# Patient Record
(demographics unavailable — no encounter records)

---

## 2025-06-12 NOTE — PHYSICAL EXAM
[de-identified] : Left foot Physical Examination:  General: Alert and oriented x3.  In no acute distress.  Pleasant in nature with a normal affect.  No apparent respiratory distress.  Erythema, Warmth, Rubor: Negative Swelling: Negative  ROM Ankle: 1. Dorsiflexion: 10 degrees 2. Plantarflexion: 40 degrees 3. Inversion: 30 degrees 4. Eversion: 20 degrees  ROM of digits: Normal  Pes Planus: Negative Pes Cavus: Negative  Bunion: Negative Tailor's Bunion (Bunionette): Negative Hammer Toe Deformity/Deformities: Negative  Tenderness to Palpation:  1. Heel Pain: Negative 2. Midfoot Pain: Negative 3. First MTP Joint: Negative 4. Lis Franc Joint: Negative  Tenderness Metatarsals: 1st MT: Negative 2nd MT: Positive 3rd MT: Positive 4th MT: Positive 5th MT: Negative Base of the 5th MT: Negative  Ligament Pain: 1. Lis Franc Ligament: Negative 2. Plantar Fascia Ligament: Positive  Strength:  5/5 TA/GS/EHL/FHL/EDL/ADD/ABD  Pulses: 2+ DP/PT Pulses  Capillary Refill Toes: <2 seconds  Neuro: Intact motor and sensory throughout  Additional Test: 1. Pascual's Squeeze Test: Negative 2. Calcaneal Squeeze Test: Negative [de-identified] : 3 views x-rays left foot reviewed and obtained on 6/12/2025: No fracture seen.

## 2025-06-12 NOTE — HISTORY OF PRESENT ILLNESS
[FreeTextEntry1] : The patient is a 38-year-old female who presents with left foot pain.  She stubbed her small toe left foot and broke it in November 2024.  She saw a podiatrist, nonsurgical care.  Patient went back to podiatrist in January due to pain left small.  Patient was told if not healed, surgery might be necessary.  She states that the pain has gotten worse since then.  Here for a second opinion.  No history of surgery for the left foot.

## 2025-06-12 NOTE — DISCUSSION/SUMMARY
[de-identified] : MRI left foot without contrast ordered to evaluate for stress fracture/soft tissue injury, plantar fascia tear.  Once the MRI is completed, I will call the patient with the results.  Proper shoewear discussed.  Activity modifications.  Over-the-counter NSAIDs/Tylenol as needed, as directed, as tolerated by the patient.  All of her questions were answered.  She understood and agreed to the treatment course we will follow-up with the MRI of the left foot without contrast.

## 2025-07-16 NOTE — PHYSICAL EXAM
[de-identified] : Left foot Physical Examination:  General: Alert and oriented x3.  In no acute distress.  Pleasant in nature with a normal affect.  No apparent respiratory distress.  Erythema, Warmth, Rubor: Negative Swelling: Negative  ROM Ankle: 1. Dorsiflexion: 10 degrees 2. Plantarflexion: 40 degrees 3. Inversion: 30 degrees 4. Eversion: 20 degrees  ROM of digits: Normal  Pes Planus: Negative Pes Cavus: Negative  Bunion: Negative Tailor's Bunion (Bunionette): Negative Hammer Toe Deformity/Deformities: Negative  Tenderness to Palpation:  1. Heel Pain: Negative 2. Midfoot Pain: Negative 3. First MTP Joint: Negative 4. Lis Franc Joint: Negative  Tenderness Metatarsals: 1st MT: Negative 2nd MT: Positive 3rd MT: Positive 4th MT: Positive 5th MT: Negative Base of the 5th MT: Negative  Ligament Pain: 1. Lis Franc Ligament: Negative 2. Plantar Fascia Ligament: Positive  Strength:  5/5 TA/GS/EHL/FHL/EDL/ADD/ABD  Pulses: 2+ DP/PT Pulses  Capillary Refill Toes: <2 seconds  Neuro: Intact motor and sensory throughout  Additional Test: 1. Pascual's Squeeze Test: Negative 2. Calcaneal Squeeze Test: Negative [de-identified] :  Radiology imaging reviewed in office with the patient on 07/16/2025 and I agree with the radiologist's impression below:  EXAM: [id number] - MR FOOT LT  - ORDERED BY: NESTOR MCINTOSH   PROCEDURE DATE:  06/17/2025    INTERPRETATION:  EXAMINATION: MRI left foot without contrast  CLINICAL INFORMATION: Left foot pain  TECHNIQUE: Multiplanar, multisequential MR imaging was performed. Imaging was performed from the level of the navicular through the distal foot.  FINDINGS: There is a nondisplaced fracture of the middle phalanx of the fifth toe with adjacent marrow edema. There are no other fractures. There are no joint effusions. 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 Articular cartilage at the other imaged joints is preserved. The Lisfranc ligament is intact. Tarsometatarsal alignment is normal. The capsular ligaments at the metatarsophalangeal joints and interphalangeal joints are intact. There are no acute plantar plate ruptures. There are no tendon or muscle tears. There is no muscle atrophy. There is small amount of fluid within the first intermetatarsal space bursa, consistent with bursitis.     IMPRESSION: Nondisplaced fracture of the middle phalanx of the fifth toe with adjacent marrow edema.  --- End of Report ---       PATRICIA YOUNGER MD; Attending Radiologist This document has been electronically signed. Jun 23 2025  3:53PM

## 2025-07-16 NOTE — DISCUSSION/SUMMARY
[de-identified] :  Today I had a lengthy discussion with the patient regarding their left foot pain. I have addressed all the patient's concerns surrounding the pathology of their condition.   I have reviewed the patient's MRI results with them in great detail. I recommend that the patient utilize ice, NSAIDS PRN, and heat. They can also elevate their LLE above the level of the heart. I recommended that the patient utilize a CAM boot. The patient was fitted for the CAM boot in the office today. The patient was educated about the boot wear pattern and utilization, as well as the timeframe to come out of the boot. She was also given full instructions for using the boot. I recommend that the patient utilize 15mg meloxicam with food once per day as instructed. A prescription for the meloxicam was ordered for the patient in the office today.  Possible physical therapy prescription via phone  The patient understood and verbally agreed to the treatment plan. All of their questions were answered and they were satisfied with the visit. The patient should call the office if they have any questions or experience worsening symptoms.  FU in 2 weeks

## 2025-07-16 NOTE — PHYSICAL EXAM
[de-identified] : Left foot Physical Examination:  General: Alert and oriented x3.  In no acute distress.  Pleasant in nature with a normal affect.  No apparent respiratory distress.  Erythema, Warmth, Rubor: Negative Swelling: Negative  ROM Ankle: 1. Dorsiflexion: 10 degrees 2. Plantarflexion: 40 degrees 3. Inversion: 30 degrees 4. Eversion: 20 degrees  ROM of digits: Normal  Pes Planus: Negative Pes Cavus: Negative  Bunion: Negative Tailor's Bunion (Bunionette): Negative Hammer Toe Deformity/Deformities: Negative  Tenderness to Palpation:  1. Heel Pain: Negative 2. Midfoot Pain: Negative 3. First MTP Joint: Negative 4. Lis Franc Joint: Negative  Tenderness Metatarsals: 1st MT: Negative 2nd MT: Positive 3rd MT: Positive 4th MT: Positive 5th MT: Negative Base of the 5th MT: Negative  Ligament Pain: 1. Lis Franc Ligament: Negative 2. Plantar Fascia Ligament: Positive  Strength:  5/5 TA/GS/EHL/FHL/EDL/ADD/ABD  Pulses: 2+ DP/PT Pulses  Capillary Refill Toes: <2 seconds  Neuro: Intact motor and sensory throughout  Additional Test: 1. Pascual's Squeeze Test: Negative 2. Calcaneal Squeeze Test: Negative [de-identified] :  Radiology imaging reviewed in office with the patient on 07/16/2025 and I agree with the radiologist's impression below:  EXAM: [id number] - MR FOOT LT  - ORDERED BY: NESTOR MCINTOSH   PROCEDURE DATE:  06/17/2025    INTERPRETATION:  EXAMINATION: MRI left foot without contrast  CLINICAL INFORMATION: Left foot pain  TECHNIQUE: Multiplanar, multisequential MR imaging was performed. Imaging was performed from the level of the navicular through the distal foot.  FINDINGS: There is a nondisplaced fracture of the middle phalanx of the fifth toe with adjacent marrow edema. There are no other fractures. There are no joint effusions. 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 Articular cartilage at the other imaged joints is preserved. The Lisfranc ligament is intact. Tarsometatarsal alignment is normal. The capsular ligaments at the metatarsophalangeal joints and interphalangeal joints are intact. There are no acute plantar plate ruptures. There are no tendon or muscle tears. There is no muscle atrophy. There is small amount of fluid within the first intermetatarsal space bursa, consistent with bursitis.     IMPRESSION: Nondisplaced fracture of the middle phalanx of the fifth toe with adjacent marrow edema.  --- End of Report ---       PATRICIA YOUNGER MD; Attending Radiologist This document has been electronically signed. Jun 23 2025  3:53PM

## 2025-07-16 NOTE — DISCUSSION/SUMMARY
[de-identified] :  Today I had a lengthy discussion with the patient regarding their left foot pain. I have addressed all the patient's concerns surrounding the pathology of their condition.   I have reviewed the patient's MRI results with them in great detail. I recommend that the patient utilize ice, NSAIDS PRN, and heat. They can also elevate their LLE above the level of the heart. I recommended that the patient utilize a CAM boot. The patient was fitted for the CAM boot in the office today. The patient was educated about the boot wear pattern and utilization, as well as the timeframe to come out of the boot. She was also given full instructions for using the boot. I recommend that the patient utilize 15mg meloxicam with food once per day as instructed. A prescription for the meloxicam was ordered for the patient in the office today.  Possible physical therapy prescription via phone  The patient understood and verbally agreed to the treatment plan. All of their questions were answered and they were satisfied with the visit. The patient should call the office if they have any questions or experience worsening symptoms.  FU in 2 weeks

## 2025-07-16 NOTE — ADDENDUM
[FreeTextEntry1] : I, Iam Stark, acted solely as a scribe for Dr. Iam Mi on this date 07/16/2025  .   All medical record entries made by the Scribe were at my, Dr. Iam Mi, direction and personally dictated by me on 07/16/2025 . I have reviewed the chart and agree that the record accurately reflects my personal performance of the history, physical exam, assessment and plan. I have also personally directed, reviewed, and agreed with the chart.

## 2025-07-16 NOTE — HISTORY OF PRESENT ILLNESS
[FreeTextEntry1] : 7/16/2025: The patient is a 38 year old female presenting for a follow-up evaluation of her left foot pain and MRI review. She reports that her symptoms ahve not improved since her last visit. Her pain has progressed to more areas of her foot near her arch.  The patient presents to the office in sneakers and ambulating without assistance.  6/12/2025: The patient is a 38-year-old female who presents with left foot pain.  She stubbed her small toe left foot and broke it in November 2024.  She saw a podiatrist, nonsurgical care.  Patient went back to podiatrist in January due to pain left small.  Patient was told if not healed, surgery might be necessary.  She states that the pain has gotten worse since then.  Here for a second opinion.  No history of surgery for the left foot.